# Patient Record
Sex: FEMALE | Race: BLACK OR AFRICAN AMERICAN | Employment: UNEMPLOYED | URBAN - METROPOLITAN AREA
[De-identification: names, ages, dates, MRNs, and addresses within clinical notes are randomized per-mention and may not be internally consistent; named-entity substitution may affect disease eponyms.]

---

## 2017-06-29 ENCOUNTER — HOSPITAL ENCOUNTER (OUTPATIENT)
Dept: PHYSICAL THERAPY | Age: 62
Discharge: HOME | End: 2017-06-30
Admitting: SOCIAL WORKER

## 2017-08-04 ENCOUNTER — HOSPITAL ENCOUNTER (OUTPATIENT)
Dept: PHYSICAL THERAPY | Age: 62
Discharge: HOME | End: 2017-08-04
Admitting: SOCIAL WORKER

## 2017-08-08 ENCOUNTER — HOSPITAL ENCOUNTER (OUTPATIENT)
Dept: PHYSICAL THERAPY | Age: 62
Discharge: HOME | End: 2017-08-08
Admitting: SOCIAL WORKER

## 2020-01-20 ENCOUNTER — APPOINTMENT (OUTPATIENT)
Dept: CT IMAGING | Age: 65
End: 2020-01-20
Payer: COMMERCIAL

## 2020-01-20 ENCOUNTER — HOSPITAL ENCOUNTER (EMERGENCY)
Age: 65
Discharge: HOME | End: 2020-01-20
Attending: EMERGENCY MEDICINE
Payer: COMMERCIAL

## 2020-01-20 VITALS
OXYGEN SATURATION: 90 % | SYSTOLIC BLOOD PRESSURE: 115 MMHG | HEART RATE: 87 BPM | TEMPERATURE: 98.3 F | RESPIRATION RATE: 14 BRPM | DIASTOLIC BLOOD PRESSURE: 86 MMHG

## 2020-01-20 LAB
A/G RATIO: 1.2 (ref 1.1–2.2)
ALBUMIN SERPL-MCNC: 4.2 G/DL (ref 3.4–5)
ALP BLD-CCNC: 103 U/L (ref 40–129)
ALT SERPL-CCNC: 10 U/L (ref 10–40)
ANION GAP SERPL CALCULATED.3IONS-SCNC: 11 MMOL/L (ref 3–16)
AST SERPL-CCNC: 13 U/L (ref 15–37)
BASOPHILS ABSOLUTE: 0.1 K/UL (ref 0–0.2)
BASOPHILS RELATIVE PERCENT: 0.6 %
BILIRUB SERPL-MCNC: 0.4 MG/DL (ref 0–1)
BILIRUBIN URINE: NEGATIVE
BLOOD, URINE: ABNORMAL
BUN BLDV-MCNC: 10 MG/DL (ref 7–20)
CALCIUM SERPL-MCNC: 9.5 MG/DL (ref 8.3–10.6)
CHLORIDE BLD-SCNC: 104 MMOL/L (ref 99–110)
CLARITY: ABNORMAL
CO2: 24 MMOL/L (ref 21–32)
COLOR: YELLOW
CREAT SERPL-MCNC: 1 MG/DL (ref 0.6–1.2)
EOSINOPHILS ABSOLUTE: 0.1 K/UL (ref 0–0.6)
EOSINOPHILS RELATIVE PERCENT: 1 %
EPITHELIAL CELLS, UA: 4 /HPF (ref 0–5)
GFR AFRICAN AMERICAN: >60
GFR NON-AFRICAN AMERICAN: 56
GLOBULIN: 3.4 G/DL
GLUCOSE BLD-MCNC: 114 MG/DL (ref 70–99)
GLUCOSE URINE: NEGATIVE MG/DL
HCT VFR BLD CALC: 39.2 % (ref 36–48)
HEMOGLOBIN: 13 G/DL (ref 12–16)
HYALINE CASTS: 8 /LPF (ref 0–8)
KETONES, URINE: NEGATIVE MG/DL
LEUKOCYTE ESTERASE, URINE: ABNORMAL
LIPASE: 22 U/L (ref 13–60)
LYMPHOCYTES ABSOLUTE: 3.5 K/UL (ref 1–5.1)
LYMPHOCYTES RELATIVE PERCENT: 38.2 %
MCH RBC QN AUTO: 30.9 PG (ref 26–34)
MCHC RBC AUTO-ENTMCNC: 33.2 G/DL (ref 31–36)
MCV RBC AUTO: 93.2 FL (ref 80–100)
MICROSCOPIC EXAMINATION: YES
MONOCYTES ABSOLUTE: 0.5 K/UL (ref 0–1.3)
MONOCYTES RELATIVE PERCENT: 5 %
NEUTROPHILS ABSOLUTE: 5.1 K/UL (ref 1.7–7.7)
NEUTROPHILS RELATIVE PERCENT: 55.2 %
NITRITE, URINE: NEGATIVE
PDW BLD-RTO: 13.5 % (ref 12.4–15.4)
PH UA: 6.5 (ref 5–8)
PLATELET # BLD: 366 K/UL (ref 135–450)
PMV BLD AUTO: 6.5 FL (ref 5–10.5)
POTASSIUM SERPL-SCNC: 3.9 MMOL/L (ref 3.5–5.1)
PROTEIN UA: NEGATIVE MG/DL
RBC # BLD: 4.21 M/UL (ref 4–5.2)
RBC UA: 33 /HPF (ref 0–4)
SODIUM BLD-SCNC: 139 MMOL/L (ref 136–145)
SPECIFIC GRAVITY UA: 1.02 (ref 1–1.03)
TOTAL PROTEIN: 7.6 G/DL (ref 6.4–8.2)
URINE REFLEX TO CULTURE: YES
URINE TYPE: ABNORMAL
UROBILINOGEN, URINE: 0.2 E.U./DL
WBC # BLD: 9.2 K/UL (ref 4–11)
WBC UA: 37 /HPF (ref 0–5)

## 2020-01-20 PROCEDURE — 99284 EMERGENCY DEPT VISIT MOD MDM: CPT

## 2020-01-20 PROCEDURE — 83690 ASSAY OF LIPASE: CPT

## 2020-01-20 PROCEDURE — 6370000000 HC RX 637 (ALT 250 FOR IP): Performed by: PHYSICIAN ASSISTANT

## 2020-01-20 PROCEDURE — 85025 COMPLETE CBC W/AUTO DIFF WBC: CPT

## 2020-01-20 PROCEDURE — 74176 CT ABD & PELVIS W/O CONTRAST: CPT

## 2020-01-20 PROCEDURE — 81001 URINALYSIS AUTO W/SCOPE: CPT

## 2020-01-20 PROCEDURE — 87086 URINE CULTURE/COLONY COUNT: CPT

## 2020-01-20 PROCEDURE — 80053 COMPREHEN METABOLIC PANEL: CPT

## 2020-01-20 PROCEDURE — 96374 THER/PROPH/DIAG INJ IV PUSH: CPT

## 2020-01-20 PROCEDURE — 6360000002 HC RX W HCPCS: Performed by: PHYSICIAN ASSISTANT

## 2020-01-20 PROCEDURE — 2580000003 HC RX 258: Performed by: PHYSICIAN ASSISTANT

## 2020-01-20 PROCEDURE — 96375 TX/PRO/DX INJ NEW DRUG ADDON: CPT

## 2020-01-20 RX ORDER — HYDROMORPHONE HYDROCHLORIDE 1 MG/ML
1 INJECTION, SOLUTION INTRAMUSCULAR; INTRAVENOUS; SUBCUTANEOUS ONCE
Status: COMPLETED | OUTPATIENT
Start: 2020-01-20 | End: 2020-01-20

## 2020-01-20 RX ORDER — OXYCODONE HYDROCHLORIDE AND ACETAMINOPHEN 5; 325 MG/1; MG/1
1 TABLET ORAL ONCE
Status: COMPLETED | OUTPATIENT
Start: 2020-01-20 | End: 2020-01-20

## 2020-01-20 RX ORDER — 0.9 % SODIUM CHLORIDE 0.9 %
500 INTRAVENOUS SOLUTION INTRAVENOUS ONCE
Status: COMPLETED | OUTPATIENT
Start: 2020-01-20 | End: 2020-01-20

## 2020-01-20 RX ORDER — HYDROCODONE BITARTRATE AND ACETAMINOPHEN 5; 325 MG/1; MG/1
1 TABLET ORAL EVERY 6 HOURS PRN
Qty: 10 TABLET | Refills: 0 | Status: SHIPPED | OUTPATIENT
Start: 2020-01-20 | End: 2020-01-23

## 2020-01-20 RX ORDER — TAMSULOSIN HYDROCHLORIDE 0.4 MG/1
0.4 CAPSULE ORAL ONCE
Status: COMPLETED | OUTPATIENT
Start: 2020-01-20 | End: 2020-01-20

## 2020-01-20 RX ORDER — CIPROFLOXACIN 500 MG/1
500 TABLET, FILM COATED ORAL 2 TIMES DAILY
Qty: 20 TABLET | Refills: 0 | Status: SHIPPED | OUTPATIENT
Start: 2020-01-20 | End: 2020-01-30

## 2020-01-20 RX ORDER — ONDANSETRON 4 MG/1
4 TABLET, FILM COATED ORAL EVERY 8 HOURS PRN
Qty: 20 TABLET | Refills: 0 | Status: SHIPPED | OUTPATIENT
Start: 2020-01-20

## 2020-01-20 RX ORDER — TAMSULOSIN HYDROCHLORIDE 0.4 MG/1
0.4 CAPSULE ORAL DAILY
Qty: 5 CAPSULE | Refills: 0 | Status: SHIPPED | OUTPATIENT
Start: 2020-01-20 | End: 2020-01-25

## 2020-01-20 RX ORDER — ONDANSETRON 2 MG/ML
4 INJECTION INTRAMUSCULAR; INTRAVENOUS ONCE
Status: COMPLETED | OUTPATIENT
Start: 2020-01-20 | End: 2020-01-20

## 2020-01-20 RX ADMIN — ONDANSETRON 4 MG: 2 INJECTION INTRAMUSCULAR; INTRAVENOUS at 16:11

## 2020-01-20 RX ADMIN — OXYCODONE HYDROCHLORIDE AND ACETAMINOPHEN 1 TABLET: 5; 325 TABLET ORAL at 17:53

## 2020-01-20 RX ADMIN — HYDROMORPHONE HYDROCHLORIDE 1 MG: 1 INJECTION, SOLUTION INTRAMUSCULAR; INTRAVENOUS; SUBCUTANEOUS at 16:12

## 2020-01-20 RX ADMIN — SODIUM CHLORIDE 500 ML: 9 INJECTION, SOLUTION INTRAVENOUS at 16:10

## 2020-01-20 RX ADMIN — TAMSULOSIN HYDROCHLORIDE 0.4 MG: 0.4 CAPSULE ORAL at 17:52

## 2020-01-20 ASSESSMENT — PAIN DESCRIPTION - PAIN TYPE: TYPE: ACUTE PAIN

## 2020-01-20 ASSESSMENT — ENCOUNTER SYMPTOMS
STRIDOR: 0
CONSTIPATION: 0
BLOOD IN STOOL: 0
WHEEZING: 0
ABDOMINAL PAIN: 1
EYE PAIN: 0
BACK PAIN: 0
RECTAL PAIN: 0
COLOR CHANGE: 0
EYE REDNESS: 0
ANAL BLEEDING: 0
COUGH: 0
NAUSEA: 1
ABDOMINAL DISTENTION: 0
SHORTNESS OF BREATH: 0
VOMITING: 0
DIARRHEA: 0

## 2020-01-20 ASSESSMENT — PAIN SCALES - GENERAL
PAINLEVEL_OUTOF10: 10
PAINLEVEL_OUTOF10: 8
PAINLEVEL_OUTOF10: 7
PAINLEVEL_OUTOF10: 10
PAINLEVEL_OUTOF10: 9

## 2020-01-20 NOTE — ED NOTES
Dr Linda Paige at bedside. Patient resting comfortably with no signs of distress. Denies any needs at this time. Bed locked and in lowest position with both side rails raised. Call light within reach.      Lopez Kwong RN  01/20/20 4653

## 2020-01-20 NOTE — ED PROVIDER NOTES
[facility]        Pt Name: Maryetta Fabry  MRN: [id number]  [place] 1955  Date of evaluation: 1/20/2020  Provider: Aba Pollard PA-C  PCP: Wilda Mei    This patient was seen and evaluated by the attending physician Dr Ezequiel Hernandez       Chief Complaint   Patient presents with    Flank Pain     pt with extreme pain in right side of groin and into her back. HISTORY OF PRESENT ILLNESS   (Location/Symptom, Timing/Onset, Context/Setting, Quality, Duration, Modifying Factors, Severity)  Note limiting factors. Maryetta Fabry is a 71 y.o. female with history of hypertension, bipolar disorder, interstitial cystitis who presents to the emergency department writhing in pain in the right flank with radiation to the right low abdomen/groin starting yesterday. She denies any changes in urination or bowels. Reports some mild nausea. She rates her pain to be a 10 out of 10 on pain scale. She has no history of kidney stone. She states this does not feel like interstitial cystitis type of pain. She sees a nurse practitioner with the urology group. She is on multiple medications for interstitial cystitis. Nursing Notes were all reviewed and agreed with or any disagreements were addressed in the HPI. REVIEW OF SYSTEMS    (2-9 systems for level 4, 10 or more for level 5)     Review of Systems   Constitutional: Negative for chills and fever. HENT: Negative. Eyes: Negative for pain, redness and visual disturbance. Respiratory: Negative for cough, shortness of breath, wheezing and stridor. Cardiovascular: Negative for chest pain, palpitations and leg swelling. Gastrointestinal: Positive for abdominal pain and nausea. Negative for abdominal distention, anal bleeding, blood in stool, constipation, diarrhea, rectal pain and vomiting. Genitourinary: Positive for flank pain.  Negative for mg under the tongue every 6 hours as needed for Cramping    LAMOTRIGINE (LAMICTAL) 25 MG TABLET    Take 150 mg by mouth daily     MAGNESIUM 30 MG TABLET    Take 30 mg by mouth daily 250    MONTELUKAST (SINGULAIR) 10 MG TABLET    Take 10 mg by mouth nightly    MULTIPLE VITAMINS-MINERALS (THERAPEUTIC MULTIVITAMIN-MINERALS) TABLET    Take 1 tablet by mouth daily    OMEPRAZOLE (PRILOSEC) 40 MG CAPSULE    Take 40 mg by mouth daily    PENTOSAN POLYSULFATE (ELMIRON) 100 MG CAPSULE    Take 100 mg by mouth 3 times daily (before meals)    PROBIOTIC PRODUCT (PROBIOTIC DAILY PO)    Take by mouth    PROMETHAZINE (PHENERGAN) 25 MG TABLET    Take 25 mg by mouth every 6 hours as needed for Nausea    TRIAZOLAM (HALCION) 0.25 MG TABLET    Take 0.25 mg by mouth nightly    UNABLE TO FIND    IODINE ONE DROP DAILY AND KELP ONE DROP DAILY         ALLERGIES     Ibuprofen; Iodides; Naproxen; and Codeine    FAMILYHISTORY       Family History   Problem Relation Age of Onset    Heart Disease Mother     Heart Disease Father     Heart Disease Brother           SOCIAL HISTORY       Social History     Tobacco Use    Smoking status: Former Smoker     Packs/day: 0.50     Years: 20.00     Pack years: 10.00     Types: Cigarettes     Last attempt to quit: 2012     Years since quittin.0    Smokeless tobacco: Never Used   Substance Use Topics    Alcohol use: Yes     Comment: socially    Drug use: No       SCREENINGS             PHYSICAL EXAM    (up to 7 for level 4, 8 or more for level 5)     ED Triage Vitals   BP Temp Temp Source Pulse Resp SpO2 Height Weight   -- 20 1544 20 1544 20 1547 20 1547 20 1547 -- --    98.3 °F (36.8 °C) Infrared 80 17 95 %         Physical Exam  Vitals signs and nursing note reviewed. Constitutional:       Appearance: Normal appearance. She is well-developed. She is not toxic-appearing or diaphoretic. HENT:      Head: Normocephalic and atraumatic.       Right Ear: External ear abscess,  urosepsis, fistula or other concerning pathology. FINAL IMPRESSION      1. Ureteric stone    2. Renal colic          DISPOSITION/PLAN   DISPOSITION Decision To Discharge 01/20/2020 05:46:24 PM      PATIENT REFERREDTO:  Jose Jaque[address]  [phone]      for follow up in 1-3 days    [facility] Emergency Department  [address] [facility]  [phone]    If symptoms worsen    Argentina Mcbride MD  [address] [place] [address] [phone]      or see your urologist in 1-2 days      DISCHARGE MEDICATIONS:  New Prescriptions    CIPROFLOXACIN (CIPRO) 500 MG TABLET    Take 1 tablet by mouth 2 times daily for 10 days    HYDROCODONE-ACETAMINOPHEN (NORCO) 5-325 MG PER TABLET    Take 1 tablet by mouth every 6 hours as needed for Pain for up to 3 days.     ONDANSETRON (ZOFRAN) 4 MG TABLET    Take 1 tablet by mouth every 8 hours as needed for Nausea    TAMSULOSIN (FLOMAX) 0.4 MG CAPSULE    Take 1 capsule by mouth daily for 5 doses       DISCONTINUED MEDICATIONS:  Discontinued Medications    No medications on file              (Please note that portions of this note were completed with a voice recognition program.  Efforts were made to edit the dictations but occasionally words are mis-transcribed.)    Becka Hand PA-C (electronically signed)           Becka Hand PA-C  01/20/20 1953

## 2020-01-20 NOTE — ED NOTES
Pt states pain feeling better, states unable to urinate at this time.      Mitzi Rubinstein, RN  01/20/20 4836

## 2020-01-20 NOTE — ED NOTES
Patient resting comfortably with signs of distress with pain 8/10. Po pain pill given per mar. Denies any needs at this time. Bed locked and in lowest position with both side rails raised. Call light within reach. Pt updated on plan of care.       Brendon Ceballos RN  01/20/20 0249

## 2020-01-20 NOTE — ED PROVIDER NOTES
I independently performed a history and physical on [address] [place] [address]. All diagnostic, treatment, and disposition decisions were made by myself in conjunction with the advanced practice provider. Briefly, this is a 71 y.o. female here for flank pain for the past day patient with bladder stenosis hematuria dysuria. No fevers no chills no sweats nausea no diarrhea. Jacinto Car Hx of interstitial cystitis, IBS, GERD, DM, Bipolar. On exam, WDWN F, NAD, Heart RRR, Lungs CTAB, no r/r/w. Abdomen Soft, NT, ND. Screenings            MDM  Work-up has a small 3 mm stone left side with minimal hydro. Will control pain with analgesia have follow-up with urology as an outpatient. Patient Referrals:  June Kaminski  [address]  [phone]      for follow up in 1-3 days    [facility] Emergency Department  [address] [facility]  [phone]    If symptoms worsen    Tobias Stevenson MD  [address] [place] [address] [phone]      or see your urologist in 1-2 days      Discharge Medications:  New Prescriptions    CIPROFLOXACIN (CIPRO) 500 MG TABLET    Take 1 tablet by mouth 2 times daily for 10 days    HYDROCODONE-ACETAMINOPHEN (NORCO) 5-325 MG PER TABLET    Take 1 tablet by mouth every 6 hours as needed for Pain for up to 3 days. ONDANSETRON (ZOFRAN) 4 MG TABLET    Take 1 tablet by mouth every 8 hours as needed for Nausea    TAMSULOSIN (FLOMAX) 0.4 MG CAPSULE    Take 1 capsule by mouth daily for 5 doses       FINAL IMPRESSION  1. Ureteric stone    2. Renal colic        Blood pressure 129/80, pulse 85, temperature 98.3 °F (36.8 °C), temperature source Infrared, resp. rate 14, SpO2 97 %. For further details of [facility] emergency department encounter, please see documentation by advanced practice provider, Luis A Javier MD  01/20/20 4342

## 2020-01-20 NOTE — ED NOTES
Bed: 22  Expected date:   Expected time:   Means of arrival:   Comments:  [address] GENOVEVA Mancini RN  01/20/20 7799

## 2020-01-20 NOTE — ED NOTES
Discharge instructions and prescriptions reviewed with pt. Pt allowed opportunity to ask questions and verbalized understanding. Pt provided one copy of discharge instructions, pt provided urine strainer to take home, instructions given, if stone pass to save and take to urologist appointment to be assessed. Pt walking w no c/o pain. Pt walked out to car with .       Dusty Ventura RN  01/20/20 4728

## 2020-01-20 NOTE — ED NOTES
Pt back from Ct. Back in bed and back on monitor. Patient resting comfortably with no signs of distress. Denies any needs at this time. Bed locked and in lowest position with both side rails raised. Call light within reach. Patient brought warm blanket at patient's request.  at bedside.  Light off at patient request.     Edmon Bernheim, RN  01/20/20 2812

## 2020-01-21 LAB — URINE CULTURE, ROUTINE: NORMAL

## 2020-02-27 ENCOUNTER — HOSPITAL ENCOUNTER (OUTPATIENT)
Dept: CT IMAGING | Age: 65
Discharge: HOME | End: 2020-02-27
Payer: COMMERCIAL

## 2020-02-27 PROCEDURE — 74176 CT ABD & PELVIS W/O CONTRAST: CPT

## 2021-12-28 ENCOUNTER — APPOINTMENT (OUTPATIENT)
Dept: CT IMAGING | Age: 66
End: 2021-12-28
Payer: COMMERCIAL

## 2021-12-28 ENCOUNTER — HOSPITAL ENCOUNTER (EMERGENCY)
Age: 66
Discharge: HOME | End: 2021-12-28
Attending: EMERGENCY MEDICINE
Payer: COMMERCIAL

## 2021-12-28 VITALS
OXYGEN SATURATION: 96 % | RESPIRATION RATE: 14 BRPM | SYSTOLIC BLOOD PRESSURE: 141 MMHG | HEART RATE: 76 BPM | DIASTOLIC BLOOD PRESSURE: 98 MMHG | BODY MASS INDEX: 37.75 KG/M2 | WEIGHT: 241 LBS | TEMPERATURE: 98.1 F

## 2021-12-28 DIAGNOSIS — M79.604 RIGHT LEG PAIN: Primary | ICD-10-CM

## 2021-12-28 DIAGNOSIS — R06.00 DYSPNEA, UNSPECIFIED TYPE: ICD-10-CM

## 2021-12-28 DIAGNOSIS — R03.0 ELEVATED BLOOD PRESSURE READING: ICD-10-CM

## 2021-12-28 LAB
A/G RATIO: 1.2 (ref 1.1–2.2)
ALBUMIN SERPL-MCNC: 4.2 G/DL (ref 3.4–5)
ALP BLD-CCNC: 142 U/L (ref 40–129)
ALT SERPL-CCNC: 13 U/L (ref 10–40)
ANION GAP SERPL CALCULATED.3IONS-SCNC: 10 MMOL/L (ref 3–16)
APTT: 35.2 SEC (ref 26.2–38.6)
AST SERPL-CCNC: 15 U/L (ref 15–37)
BASOPHILS ABSOLUTE: 0.1 K/UL (ref 0–0.2)
BASOPHILS RELATIVE PERCENT: 0.9 %
BILIRUB SERPL-MCNC: 0.4 MG/DL (ref 0–1)
BUN BLDV-MCNC: 10 MG/DL (ref 7–20)
CALCIUM SERPL-MCNC: 9.7 MG/DL (ref 8.3–10.6)
CHLORIDE BLD-SCNC: 100 MMOL/L (ref 99–110)
CO2: 27 MMOL/L (ref 21–32)
CREAT SERPL-MCNC: 0.9 MG/DL (ref 0.6–1.2)
EKG ATRIAL RATE: 81 BPM
EKG DIAGNOSIS: NORMAL
EKG P AXIS: 49 DEGREES
EKG P-R INTERVAL: 170 MS
EKG Q-T INTERVAL: 346 MS
EKG QRS DURATION: 70 MS
EKG QTC CALCULATION (BAZETT): 401 MS
EKG R AXIS: -12 DEGREES
EKG T AXIS: 0 DEGREES
EKG VENTRICULAR RATE: 81 BPM
EOSINOPHILS ABSOLUTE: 0.1 K/UL (ref 0–0.6)
EOSINOPHILS RELATIVE PERCENT: 1.3 %
GFR AFRICAN AMERICAN: >60
GFR NON-AFRICAN AMERICAN: >60
GLUCOSE BLD-MCNC: 112 MG/DL (ref 70–99)
HCG QUALITATIVE: NEGATIVE
HCT VFR BLD CALC: 39.8 % (ref 36–48)
HEMOGLOBIN: 13.3 G/DL (ref 12–16)
INR BLD: 0.92 (ref 0.88–1.12)
LYMPHOCYTES ABSOLUTE: 2.4 K/UL (ref 1–5.1)
LYMPHOCYTES RELATIVE PERCENT: 29.8 %
MCH RBC QN AUTO: 30.3 PG (ref 26–34)
MCHC RBC AUTO-ENTMCNC: 33.4 G/DL (ref 31–36)
MCV RBC AUTO: 90.6 FL (ref 80–100)
MONOCYTES ABSOLUTE: 0.4 K/UL (ref 0–1.3)
MONOCYTES RELATIVE PERCENT: 5.5 %
NEUTROPHILS ABSOLUTE: 5 K/UL (ref 1.7–7.7)
NEUTROPHILS RELATIVE PERCENT: 62.5 %
PDW BLD-RTO: 13.6 % (ref 12.4–15.4)
PLATELET # BLD: 402 K/UL (ref 135–450)
PMV BLD AUTO: 6.1 FL (ref 5–10.5)
POTASSIUM REFLEX MAGNESIUM: 3.7 MMOL/L (ref 3.5–5.1)
PRO-BNP: 52 PG/ML (ref 0–124)
PROTHROMBIN TIME: 10.4 SEC (ref 9.9–12.7)
RBC # BLD: 4.39 M/UL (ref 4–5.2)
SODIUM BLD-SCNC: 137 MMOL/L (ref 136–145)
TOTAL PROTEIN: 7.6 G/DL (ref 6.4–8.2)
TROPONIN: <0.01 NG/ML
WBC # BLD: 8 K/UL (ref 4–11)

## 2021-12-28 PROCEDURE — 93005 ELECTROCARDIOGRAM TRACING: CPT | Performed by: PHYSICIAN ASSISTANT

## 2021-12-28 PROCEDURE — 93010 ELECTROCARDIOGRAM REPORT: CPT | Performed by: INTERNAL MEDICINE

## 2021-12-28 PROCEDURE — 96374 THER/PROPH/DIAG INJ IV PUSH: CPT

## 2021-12-28 PROCEDURE — 80053 COMPREHEN METABOLIC PANEL: CPT

## 2021-12-28 PROCEDURE — 6370000000 HC RX 637 (ALT 250 FOR IP): Performed by: PHYSICIAN ASSISTANT

## 2021-12-28 PROCEDURE — 6360000004 HC RX CONTRAST MEDICATION: Performed by: EMERGENCY MEDICINE

## 2021-12-28 PROCEDURE — 93971 EXTREMITY STUDY: CPT

## 2021-12-28 PROCEDURE — 84703 CHORIONIC GONADOTROPIN ASSAY: CPT

## 2021-12-28 PROCEDURE — 6360000002 HC RX W HCPCS: Performed by: PHYSICIAN ASSISTANT

## 2021-12-28 PROCEDURE — 85025 COMPLETE CBC W/AUTO DIFF WBC: CPT

## 2021-12-28 PROCEDURE — 85730 THROMBOPLASTIN TIME PARTIAL: CPT

## 2021-12-28 PROCEDURE — 85610 PROTHROMBIN TIME: CPT

## 2021-12-28 PROCEDURE — 84484 ASSAY OF TROPONIN QUANT: CPT

## 2021-12-28 PROCEDURE — 71260 CT THORAX DX C+: CPT

## 2021-12-28 PROCEDURE — 83880 ASSAY OF NATRIURETIC PEPTIDE: CPT

## 2021-12-28 PROCEDURE — 99285 EMERGENCY DEPT VISIT HI MDM: CPT

## 2021-12-28 RX ORDER — DIPHENHYDRAMINE HYDROCHLORIDE 50 MG/ML
25 INJECTION INTRAMUSCULAR; INTRAVENOUS ONCE
Status: COMPLETED | OUTPATIENT
Start: 2021-12-28 | End: 2021-12-28

## 2021-12-28 RX ORDER — HYDROCODONE BITARTRATE AND ACETAMINOPHEN 5; 325 MG/1; MG/1
1 TABLET ORAL ONCE
Status: COMPLETED | OUTPATIENT
Start: 2021-12-28 | End: 2021-12-28

## 2021-12-28 RX ORDER — METHYLPREDNISOLONE SODIUM SUCCINATE 125 MG/2ML
125 INJECTION, POWDER, LYOPHILIZED, FOR SOLUTION INTRAMUSCULAR; INTRAVENOUS ONCE
Status: COMPLETED | OUTPATIENT
Start: 2021-12-28 | End: 2021-12-28

## 2021-12-28 RX ORDER — ONDANSETRON 4 MG/1
4 TABLET, ORALLY DISINTEGRATING ORAL ONCE
Status: COMPLETED | OUTPATIENT
Start: 2021-12-28 | End: 2021-12-28

## 2021-12-28 RX ADMIN — METHYLPREDNISOLONE SODIUM SUCCINATE 125 MG: 125 INJECTION, POWDER, FOR SOLUTION INTRAMUSCULAR; INTRAVENOUS at 20:14

## 2021-12-28 RX ADMIN — DIPHENHYDRAMINE HYDROCHLORIDE 25 MG: 50 INJECTION, SOLUTION INTRAMUSCULAR; INTRAVENOUS at 20:14

## 2021-12-28 RX ADMIN — ONDANSETRON 4 MG: 4 TABLET, ORALLY DISINTEGRATING ORAL at 12:36

## 2021-12-28 RX ADMIN — IOPAMIDOL 75 ML: 755 INJECTION, SOLUTION INTRAVENOUS at 21:00

## 2021-12-28 RX ADMIN — HYDROCODONE BITARTRATE AND ACETAMINOPHEN 1 TABLET: 5; 325 TABLET ORAL at 12:36

## 2021-12-28 ASSESSMENT — ENCOUNTER SYMPTOMS
COLOR CHANGE: 0
CONSTIPATION: 0
BACK PAIN: 0
VOMITING: 0
DIARRHEA: 0
RESPIRATORY NEGATIVE: 1
NAUSEA: 0
SHORTNESS OF BREATH: 0
COUGH: 0
CHEST TIGHTNESS: 0
ABDOMINAL PAIN: 0

## 2021-12-28 ASSESSMENT — PAIN SCALES - GENERAL
PAINLEVEL_OUTOF10: 5
PAINLEVEL_OUTOF10: 5
PAINLEVEL_OUTOF10: 2
PAINLEVEL_OUTOF10: 4

## 2021-12-28 ASSESSMENT — PAIN DESCRIPTION - PAIN TYPE
TYPE: ACUTE PAIN
TYPE: ACUTE PAIN

## 2021-12-28 NOTE — ED PROVIDER NOTES
[facility]        Pt Name: Meño Lebron  MRN: [id number]  [place] 1955  Date of evaluation: 12/28/2021  Provider: PEACE Franklin  PCP: Mike Hutchins  Note Started: 12:28 PM EST        I have seen and evaluated this patient with my supervising physician Yonny Alejandre       Chief Complaint   Patient presents with    Other     PT presents with concern for blood clot in right leg       HISTORY OF PRESENT ILLNESS   (Location, Timing/Onset, Context/Setting, Quality, Duration, Modifying Factors, Severity, Associated Signs and Symptoms)  Note limiting factors. Chief Complaint: Right Leg Pain     Meño Lebron is a 78 y.o. female with past medical history of chronic back pain, diabetes, GERD, hypertension, IBS and bipolar affective disorder who presents to the ED with complaint of right leg pain. Patient is for the past several days she has had pain to her right leg mostly over the fine but does radiate into the calf. Patient states pain seemed to worsen with palpation. Patient that she does have a history of nerve damage in the right leg and states she initially thought this was just the case but states this feels different. Patient that she has had blood clots in the past and became concerned that potentially she had another blood clot so came to the ED for further evaluation and treatment. She states she is not currently on any blood thinners. Denies recent travel, trips, surgery or immobilization. She denies any hormone or birth control medication usage. Denies any injury or trauma. Denies significant edema, ecchymosis, erythema or warmth. Denies rashes or lesions. Denies skin changes. Denies fever chills. Denies any numbness or tingling. She denies any decreased range of motion or strength.   Denies any back pain, abdominal pain, nausea/vomiting, chest pain, shortness of breath, cough, hemoptysis, lightheadedness/dizziness, syncope, near syncope or diaphoresis. Nursing Notes were all reviewed and agreed with or any disagreements were addressed in the HPI. REVIEW OF SYSTEMS    (2-9 systems for level 4, 10 or more for level 5)     Review of Systems   Constitutional: Negative for activity change, appetite change, chills, diaphoresis, fatigue and fever. Respiratory: Negative. Negative for cough, chest tightness and shortness of breath. Cardiovascular: Negative. Negative for chest pain, palpitations and leg swelling. Gastrointestinal: Negative for abdominal pain, constipation, diarrhea, nausea and vomiting. Genitourinary: Negative for decreased urine volume, difficulty urinating, dysuria, flank pain, frequency, hematuria and urgency. Musculoskeletal: Positive for myalgias. Negative for arthralgias, back pain, gait problem, joint swelling, neck pain and neck stiffness. Skin: Negative for color change, pallor, rash and wound. Neurological: Negative for dizziness, light-headedness and headaches. Positives and Pertinent negatives as per HPI. Except as noted above in the ROS, all other systems were reviewed and negative.        PAST MEDICAL HISTORY     Past Medical History:   Diagnosis Date    Bipolar affective disorder ([facility]     Bladder disorder     Chronic back pain     Diabetes mellitus ([facility]     Fatty liver 4/29/2013    GERD (gastroesophageal reflux disease)     Hypertension     IBS (irritable bowel syndrome)          SURGICAL HISTORY     Past Surgical History:   Procedure Laterality Date    BLADDER SURGERY      BREAST SURGERY      two cysts removed Right breast    BUNIONECTOMY      x2    CARPAL TUNNEL RELEASE  1989    CHOLECYSTECTOMY, LAPAROSCOPIC  4/12/13    with liver bx    COLONOSCOPY      DILATION AND CURETTAGE OF UTERUS      x2    HYSTERECTOMY           [address]       Discharge Medication List as of 12/28/2021 11:09 PM      CONTINUE these medications which have NOT CHANGED    Details   ondansetron (ZOFRAN) 4 MG tablet Take 1 tablet by mouth every 8 hours as needed for Nausea, Disp-20 tablet, R-0Print      tamsulosin (FLOMAX) 0.4 MG capsule Take 1 capsule by mouth daily for 5 doses, Disp-5 capsule, R-0Print      pentosan polysulfate (ELMIRON) 100 MG capsule Take 100 mg by mouth 3 times daily (before meals)      omeprazole (PRILOSEC) 40 MG capsule Take 40 mg by mouth daily      hyoscyamine (LEVSIN/SL) 0.125 MG SL tablet Place 0.125 mg under the tongue every 6 hours as needed for Cramping      montelukast (SINGULAIR) 10 MG tablet Take 10 mg by mouth nightly      triazolam (HALCION) 0.25 MG tablet Take 0.25 mg by mouth nightly      promethazine (PHENERGAN) 25 MG tablet Take 25 mg by mouth every 6 hours as needed for Nausea      Multiple Vitamins-Minerals (THERAPEUTIC MULTIVITAMIN-MINERALS) tablet Take 1 tablet by mouth daily      calcium carbonate (OSCAL) 500 MG TABS tablet Take 500 mg by mouth daily      Probiotic Product (PROBIOTIC DAILY PO) Take by mouth      magnesium 30 MG tablet Take 30 mg by mouth daily 250      UNABLE TO FIND IODINE ONE DROP DAILY AND KELP ONE DROP DAILY      fluticasone-salmeterol (ADVAIR) 500-50 MCG/DOSE diskus inhaler Inhale 1 puff into the lungs every 12 hours      albuterol (PROVENTIL HFA;VENTOLIN HFA) 108 (90 BASE) MCG/ACT inhaler Inhale 2 puffs into the lungs every 6 hours as needed for Wheezing      Biotin 5000 MCG CAPS Take 3,000 mcg by mouth daily       cyclobenzaprine (FLEXERIL) 10 MG tablet Take 10 mg by mouth 3 times daily as needed.       Cholecalciferol (VITAMIN D-3) 5000 UNITS TABS Take  by mouth.      lamotrigine (LAMICTAL) 25 MG tablet Take 150 mg by mouth daily                ALLERGIES     Ibuprofen, Iodides, Naproxen, and Codeine    FAMILYHISTORY       Family History   Problem Relation Age of Onset    Heart Disease Mother     Heart Disease Father     Heart Disease Brother           SOCIAL HISTORY Social History     Tobacco Use    Smoking status: Former Smoker     Packs/day: 0.50     Years: 20.00     Pack years: 10.00     Types: Cigarettes     Quit date: 2012     Years since quittin.9    Smokeless tobacco: Never Used   Substance Use Topics    Alcohol use: Yes     Comment: socially    Drug use: No       SCREENINGS    Trevin Coma Scale  Eye Opening: Spontaneous  Best Verbal Response: Oriented  Best Motor Response: Obeys commands  Trevin Coma Scale Score: 15        PHYSICAL EXAM    (up to 7 for level 4, 8 or more for level 5)     ED Triage Vitals [21 1216]   BP Temp Temp Source Pulse Resp SpO2 Height Weight   (!) 163/124 98.1 °F (36.7 °C) Temporal 111 14 96 % -- 241 lb (109.3 kg)       Physical Exam  Constitutional:       General: She is not in acute distress. Appearance: Normal appearance. She is well-developed. She is not ill-appearing, toxic-appearing or diaphoretic. HENT:      Head: Normocephalic and atraumatic. Right Ear: External ear normal.      Left Ear: External ear normal.   Eyes:      General:         Right eye: No discharge. Left eye: No discharge. Cardiovascular:      Rate and Rhythm: Regular rhythm. Tachycardia present. Pulses: Normal pulses. Heart sounds: Normal heart sounds. No murmur heard. No friction rub. No gallop. Comments: 2+ radial pulses bilaterally. No pedal edema. No calf tenderness. No JVP  Pulmonary:      Effort: Pulmonary effort is normal. No respiratory distress. Breath sounds: Normal breath sounds. No stridor. No wheezing, rhonchi or rales. Chest:      Chest wall: No tenderness. Abdominal:      General: Abdomen is flat. There is no distension. Palpations: Abdomen is soft. There is no mass. Tenderness: There is no abdominal tenderness. There is no right CVA tenderness, left CVA tenderness, guarding or rebound. Hernia: No hernia is present.    Musculoskeletal:         General: Normal range of motion. Cervical back: Normal range of motion and neck supple. Comments: Upon examination of the right lower extremity patient is tender to palpate over the right thigh diffusely. There is no significant edema, ecchymoses, erythema or warmth. No asymmetric swelling with comparison to the left. There is no significant calf pain. Negative Homans' sign bilaterally. There is no palpable cords. full range of motion strength of the hip, knee and ankle. Full plantar flexion/dorsiflexion. Compartments are soft. No abrasion or laceration. No rashes or lesions. Capillary refill and dorsalis pedis pulse brisk. Sensation intact to the medial lateral aspects of the distal toes. Gait is normal.     Skin:     General: Skin is warm and dry. Coloration: Skin is not pale. Findings: No erythema. Neurological:      Mental Status: She is alert and oriented to person, place, and time.    Psychiatric:         Behavior: Behavior normal.         DIAGNOSTIC RESULTS   LABS:    Labs Reviewed   COMPREHENSIVE METABOLIC PANEL W/ REFLEX TO MG FOR LOW K - Abnormal; Notable for the following components:       Result Value    Glucose 112 (*)     Alkaline Phosphatase 142 (*)     All other components within normal limits    Narrative:     Performed at:  [facility] [address], [place], [address] [email or web address]   Phone [phone]   CBC WITH AUTO DIFFERENTIAL    Narrative:     Performed at:  [facility] [address], [place], [facility] [email or web address]   Phone [phone]   TROPONIN    Narrative:     Performed at:  [facility] [address], [place], [address] [email or web address]   Phone [phone]   BRAIN NATRIURETIC PEPTIDE    Narrative:     Performed at:  [facility] [address], [place], [facility] [email or web address]   Phone [phone]   PROTIME-INR    Narrative:     Performed at:  [facility]  [address] [facility], [address] [facility]   Phone [phone]   APTT    Narrative:     Performed at:  [facility]  [address] [facility], [address] [facility]   Phone [phone]   HCG, SERUM, QUALITATIVE    Narrative:     Performed at:  [facility]  [address] [facility], [address]   Phone [phone]       When ordered only abnormal lab results are displayed. All other labs were within normal range or not returned as of this dictation. EKG: When ordered, EKG's are interpreted by the Emergency Department Physician in the absence of a cardiologist.  Please see their note for interpretation of EKG. RADIOLOGY:   Non-plain film images such as CT, Ultrasound and MRI are read by the radiologist. Plain radiographic images are visualized and preliminarily interpreted by the ED Provider with the below findings:        Interpretation per the Radiologist below, if available at the time of this note:    CT CHEST PULMONARY EMBOLISM W CONTRAST   Final Result   1. No evidence of acute pulmonary embolus. 2. No acute process in the chest.   3. Mild peripheral fibrotic change. VL Extremity Venous Right   Final Result        No results found.         PROCEDURES   Unless otherwise noted below, none     Procedures    CRITICAL CARE TIME   N/A    CONSULTS:  None      EMERGENCY DEPARTMENT COURSE and DIFFERENTIAL DIAGNOSIS/MDM:   Vitals:    Vitals:    12/28/21 2004 12/28/21 2014 12/28/21 2034 12/28/21 2308   BP: (!) 159/104 (!) 151/96 (!) 154/106 (!) 141/98   Pulse: 76 80 74 76   Resp: 18 14 16 14   Temp:       TempSrc:       SpO2: 92% 95% 95% 96%   Weight:           Patient was given the following medications:  Medications   methylPREDNISolone sodium (SOLU-MEDROL) injection 125 mg (125 mg IntraVENous Given 12/28/21 2014)   diphenhydrAMINE (BENADRYL) injection 25 mg (25 mg IntraVENous Given 12/28/21 2014)   HYDROcodone-acetaminophen ([address]) 5-325 MG per tablet 1 tablet (1 tablet Oral Given 12/28/21 1236)   ondansetron (ZOFRAN-ODT) disintegrating tablet 4 mg (4 mg Oral Given 12/28/21 1236)   iopamidol (ISOVUE-370) 76 % injection 75 mL (75 mLs IntraVENous Given 12/28/21 2100)           Patient is a 78-year-old female who presents to the ED with complaint of right leg pain. She is concerned she has DVT. She is not on anticoagulation. Has pain to the right thigh radiating to the right leg. Doppler ultrasound obtained and showed no evidence of deep vein or superficial vein thrombosis involving the right lower extremity or left common femoral vein. Patient is tachycardic and states she has some slight shortness of breath. Therefore CT of the chest ordered given patient's history and physical exam.  Patient has history of document allergy to iodine. Has had CTs in the past upon outpatient review of records premedication requested by CT tech. Premedication ordered with Solu-Medrol and Benadryl. Patient evaluated initial in the rapid assessment zone and could not receive premedication until back into a room. Unfortunately limited bed availability given Covid pandemic. Patient had extended stay in the waiting room waiting for bed availability so she can receive premedication for CT scan. Blood work obtained and showed CBC with normal white count, hemoglobin and platelets. CMP relatively unremarkable. Troponin and BNP unremarkable. Coags obtained. Pregnancy was negative. CT of the chest is currently pending given end of shift. Will sign out to attending provider. Please see attending provider note for further disposition and treatment of patient. FINAL IMPRESSION      1. Right leg pain    2. Dyspnea, unspecified type    3.  Elevated blood pressure reading          DISPOSITION/PLAN   DISPOSITION Decision To Discharge 12/28/2021 10:23:05 PM      PATIENT REFERRED TO:  Harish Gaona  [address] [facility] [address] [facility] [address]  [phone]    Schedule an appointment as soon as possible for a visit       [facility] Emergency Department  [address]  Go to   If symptoms worsen      DISCHARGE MEDICATIONS:  Discharge Medication List as of 12/28/2021 11:09 PM          DISCONTINUED MEDICATIONS:  Discharge Medication List as of 12/28/2021 11:09 PM                 (Please note that portions of this note were completed with a voice recognition program.  Efforts were made to edit the dictations but occasionally words are mis-transcribed.)    PEACE Menchaca (electronically signed)          PEACE Simons  12/29/21 141

## 2021-12-29 NOTE — ED NOTES
Returned from CT, no reaction noted.  Breathing even, non-labored      Cliff Jacobs RN  12/28/21 2707

## 2021-12-29 NOTE — ED NOTES
Pt received in bed resting, breathing easy, non-labored. No distress noted. Pt reports RLE pain x several days. Swelling noted to bilateral lower extremities. Placed on bedside cardiac monitor.      Fran Henderson RN  12/28/21 6521

## 2023-07-09 LAB — IMMUNOGLOBULIN E: 192 KU/L

## 2024-03-17 ENCOUNTER — APPOINTMENT (OUTPATIENT)
Dept: GENERAL RADIOLOGY | Age: 69
End: 2024-03-17
Payer: COMMERCIAL

## 2024-03-17 ENCOUNTER — HOSPITAL ENCOUNTER (EMERGENCY)
Age: 69
Discharge: HOME | End: 2024-03-17
Payer: COMMERCIAL

## 2024-03-17 VITALS
OXYGEN SATURATION: 94 % | DIASTOLIC BLOOD PRESSURE: 76 MMHG | TEMPERATURE: 97.7 F | HEIGHT: 67 IN | HEART RATE: 79 BPM | WEIGHT: 237 LBS | BODY MASS INDEX: 37.2 KG/M2 | SYSTOLIC BLOOD PRESSURE: 139 MMHG | RESPIRATION RATE: 18 BRPM

## 2024-03-17 DIAGNOSIS — S82.892A CLOSED FRACTURE OF LEFT ANKLE, INITIAL ENCOUNTER: Primary | ICD-10-CM
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
## 2024-03-17 PROCEDURE — 73564 X-RAY EXAM KNEE 4 OR MORE: CPT

## 2024-03-17 PROCEDURE — 29515 APPLICATION SHORT LEG SPLINT: CPT

## 2024-03-17 PROCEDURE — 73590 X-RAY EXAM OF LOWER LEG: CPT

## 2024-03-17 PROCEDURE — 73610 X-RAY EXAM OF ANKLE: CPT

## 2024-03-17 PROCEDURE — 73630 X-RAY EXAM OF FOOT: CPT

## 2024-03-17 PROCEDURE — 99283 EMERGENCY DEPT VISIT LOW MDM: CPT

## 2024-03-17 PROCEDURE — 6370000000 HC RX 637 (ALT 250 FOR IP): Performed by: PHYSICIAN ASSISTANT

## 2024-03-17 RX ORDER — HYDROCODONE BITARTRATE AND ACETAMINOPHEN 5; 325 MG/1; MG/1
1 TABLET ORAL ONCE
Status: DISCONTINUED | OUTPATIENT
Start: 2024-03-17 | End: 2024-03-17

## 2024-03-17 RX ORDER — TRAMADOL HYDROCHLORIDE 50 MG/1
50 TABLET ORAL ONCE
Status: COMPLETED | OUTPATIENT
Start: 2024-03-17 | End: 2024-03-17

## 2024-03-17 RX ORDER — TRAMADOL HYDROCHLORIDE 50 MG/1
50 TABLET ORAL EVERY 4 HOURS PRN
Qty: 18 TABLET | Refills: 0 | Status: SHIPPED | OUTPATIENT
Start: 2024-03-17 | End: 2024-03-20

## 2024-03-17 RX ADMIN — TRAMADOL HYDROCHLORIDE 50 MG: 50 TABLET ORAL at 16:41

## 2024-03-17 ASSESSMENT — PAIN DESCRIPTION - ORIENTATION: ORIENTATION: LEFT

## 2024-03-17 ASSESSMENT — LIFESTYLE VARIABLES
HOW OFTEN DO YOU HAVE A DRINK CONTAINING ALCOHOL: MONTHLY OR LESS
HOW MANY STANDARD DRINKS CONTAINING ALCOHOL DO YOU HAVE ON A TYPICAL DAY: 1 OR 2

## 2024-03-17 ASSESSMENT — PAIN - FUNCTIONAL ASSESSMENT: PAIN_FUNCTIONAL_ASSESSMENT: 0-10

## 2024-03-17 ASSESSMENT — PAIN SCALES - GENERAL: PAINLEVEL_OUTOF10: 8

## 2024-03-17 ASSESSMENT — PAIN DESCRIPTION - LOCATION: LOCATION: LEG

## 2024-03-17 NOTE — ED PROVIDER NOTES
for outpatient management        I am the Primary Clinician of Record.    FINAL IMPRESSION      1. Closed fracture of left ankle, initial encounter    2. Fall down stairs, initial encounter          DISPOSITION/PLAN     DISPOSITION Discharge - Pending Orders Complete 03/17/2024 05:47:14 PM      PATIENT REFERRED TO:  Jabari eKvin MD  [address] [facility] [address]  [facility] [address] [phone]    In 3 days      Kamaljit Klein MD  [facility] [place] [id number] [phone]            DISCHARGE MEDICATIONS:  New Prescriptions    TRAMADOL (ULTRAM) 50 MG TABLET    Take 1 tablet by mouth every 4 hours as needed for Pain for up to 3 days. Intended supply: 3 days. Take lowest dose possible to manage pain Max Daily Amount: 300 mg       DISCONTINUED MEDICATIONS:  Discontinued Medications    No medications on file              (Please note that portions of this note were completed with a voice recognition program.  Efforts were made to edit the dictations but occasionally words are mis-transcribed.)    Deirdre Gandhi PA-C (electronically signed)            Deirdre Gandhi PA-C  03/17/24 [id number]

## 2024-03-18 ENCOUNTER — TELEPHONE (OUTPATIENT)
Dept: ORTHOPEDIC SURGERY | Age: 69
End: 2024-03-18

## 2024-03-18 NOTE — TELEPHONE ENCOUNTER
I left an VM asking the pt to please call the call center to make a follow from her ED visit for left ankle FX

## 2024-03-21 ENCOUNTER — PREP FOR PROCEDURE (OUTPATIENT)
Dept: ORTHOPEDIC SURGERY | Age: 69
End: 2024-03-21

## 2024-03-21 ENCOUNTER — TELEPHONE (OUTPATIENT)
Dept: ORTHOPEDIC SURGERY | Age: 69
End: 2024-03-21

## 2024-03-21 ENCOUNTER — OFFICE VISIT (OUTPATIENT)
Dept: ORTHOPEDIC SURGERY | Age: 69
End: 2024-03-21
Payer: COMMERCIAL

## 2024-03-21 VITALS — WEIGHT: 237 LBS | HEIGHT: 67 IN | BODY MASS INDEX: 37.2 KG/M2

## 2024-03-21 DIAGNOSIS — S82.832A OTHER CLOSED FRACTURE OF DISTAL END OF LEFT FIBULA, INITIAL ENCOUNTER: Primary | ICD-10-CM

## 2024-03-21 PROCEDURE — 1123F ACP DISCUSS/DSCN MKR DOCD: CPT | Performed by: ORTHOPAEDIC SURGERY

## 2024-03-21 PROCEDURE — 99204 OFFICE O/P NEW MOD 45 MIN: CPT | Performed by: ORTHOPAEDIC SURGERY

## 2024-03-21 NOTE — TELEPHONE ENCOUNTER
Auth: NPR  Date: 03/26/24  Reference # [id number]  Spoke with: None  Type of SX:Outpatient  Location: [facility]  CPT: 65327    DX: S82.832A    SX area: LEFT ANKLE  Insurance: Medicare A&B

## 2024-03-22 RX ORDER — AMLODIPINE BESYLATE 5 MG/1
5 TABLET ORAL DAILY
COMMUNITY

## 2024-03-22 RX ORDER — ALPRAZOLAM 0.25 MG/1
0.25 TABLET ORAL PRN
COMMUNITY

## 2024-03-22 NOTE — H&P (VIEW-ONLY)
and Sexual Activity    Alcohol use: No     Frequency: Never    Drug use: No    Sexual activity: Not on file   Other Topics Concern    Not on file   Social History Narrative    Not on file     Family History:   No family history on file.    Review of Systems:  No personal history of DVT, PE. 12 point ROS otherwise negative other than reported in HPI.    Physical Examination:  Patient is alert and oriented x 3 and appears well nourished and appropriate for today's visit.  Gait: The patient walks with an antalgic gait.  LEFT ankle: SILT, moving digits freely, medial and lateral minimal dorsiflexion, plantarflexion, lateral and medial swelling and tenderness, unable to bear weight    Radiographs: Multiple views of left ankle AP/lateral, oblique, there is a comminuted displaced fracture of the fibular with evidence of medial widening. There is no gross subluxation or dislocation.    Assessment and Plan?: The patient has LEFT bimalleolar equivalent ankle fracture    We discussed the diagnosis and treatment options in detail with the patient. I explained the nature of their unstable ankle fracture fracture and poor outcomes with nonoperative treatment.  We discussed the surgical procedure, recovery period, and protocol for pain management, expected post-operative course in detail.    We discussed the potential benefits of the surgery including improved mobility, pain relief, fracture healing, as well as the inherent risks including but are not limited to infection, nonunion, malunion, hardware failure, nerve or vascular injury, need for further surgery, deep vein thrombus, pulmonary embolism.   The patient has verbalized understanding of these risks and wishes to proceed.

## 2024-03-25 ENCOUNTER — ANESTHESIA EVENT (OUTPATIENT)
Dept: OPERATING ROOM | Age: 69
End: 2024-03-25
Payer: COMMERCIAL

## 2024-03-25 RX ORDER — SODIUM CHLORIDE 0.9 % (FLUSH) 0.9 %
5-40 SYRINGE (ML) INJECTION PRN
Status: CANCELLED | OUTPATIENT
Start: 2024-03-25

## 2024-03-25 RX ORDER — SODIUM CHLORIDE 9 MG/ML
INJECTION, SOLUTION INTRAVENOUS PRN
Status: CANCELLED | OUTPATIENT
Start: 2024-03-25

## 2024-03-25 RX ORDER — CELECOXIB 100 MG/1
100 CAPSULE ORAL ONCE
Status: CANCELLED | OUTPATIENT
Start: 2024-03-25 | End: 2024-03-25

## 2024-03-25 RX ORDER — ACETAMINOPHEN 325 MG/1
1000 TABLET ORAL ONCE
Status: CANCELLED | OUTPATIENT
Start: 2024-03-25 | End: 2024-03-25

## 2024-03-25 RX ORDER — SODIUM CHLORIDE 0.9 % (FLUSH) 0.9 %
5-40 SYRINGE (ML) INJECTION EVERY 12 HOURS SCHEDULED
Status: CANCELLED | OUTPATIENT
Start: 2024-03-25

## 2024-03-25 RX ORDER — ACETAMINOPHEN 500 MG
1000 TABLET ORAL ONCE
Status: CANCELLED | OUTPATIENT
Start: 2024-03-25 | End: 2024-03-25

## 2024-03-25 NOTE — CARE COORDINATION
[facility] left voicemail message to phone regarding homecare process at discharge and confirmation of demographics. Will continue to follow for services if needed post surgery.Electronically signed by Bethel Howard LPN on 3/25/24 at 1:33 PM EDT

## 2024-03-26 ENCOUNTER — APPOINTMENT (OUTPATIENT)
Dept: GENERAL RADIOLOGY | Age: 69
End: 2024-03-26
Attending: ORTHOPAEDIC SURGERY
Payer: COMMERCIAL

## 2024-03-26 ENCOUNTER — ANESTHESIA (OUTPATIENT)
Dept: OPERATING ROOM | Age: 69
End: 2024-03-26
Payer: COMMERCIAL

## 2024-03-26 ENCOUNTER — HOSPITAL ENCOUNTER (OUTPATIENT)
Age: 69
Setting detail: OUTPATIENT SURGERY
Discharge: HOME | End: 2024-03-26
Attending: ORTHOPAEDIC SURGERY | Admitting: ORTHOPAEDIC SURGERY
Payer: COMMERCIAL

## 2024-03-26 VITALS
HEART RATE: 81 BPM | RESPIRATION RATE: 16 BRPM | BODY MASS INDEX: 37.2 KG/M2 | WEIGHT: 237 LBS | HEIGHT: 67 IN | DIASTOLIC BLOOD PRESSURE: 78 MMHG | OXYGEN SATURATION: 97 % | TEMPERATURE: 97.6 F | SYSTOLIC BLOOD PRESSURE: 133 MMHG

## 2024-03-26 DIAGNOSIS — S82.832A OTHER CLOSED FRACTURE OF DISTAL END OF LEFT FIBULA, INITIAL ENCOUNTER: Primary | ICD-10-CM

## 2024-03-26 PROCEDURE — 2500000003 HC RX 250 WO HCPCS: Performed by: NURSE ANESTHETIST, CERTIFIED REGISTERED

## 2024-03-26 PROCEDURE — 27792 TREATMENT OF ANKLE FRACTURE: CPT | Performed by: ORTHOPAEDIC SURGERY

## 2024-03-26 PROCEDURE — 6370000000 HC RX 637 (ALT 250 FOR IP): Performed by: ORTHOPAEDIC SURGERY

## 2024-03-26 PROCEDURE — 6370000000 HC RX 637 (ALT 250 FOR IP): Performed by: STUDENT IN AN ORGANIZED HEALTH CARE EDUCATION/TRAINING PROGRAM

## 2024-03-26 PROCEDURE — 2709999900 HC NON-CHARGEABLE SUPPLY: Performed by: ORTHOPAEDIC SURGERY

## 2024-03-26 PROCEDURE — 6360000002 HC RX W HCPCS: Performed by: STUDENT IN AN ORGANIZED HEALTH CARE EDUCATION/TRAINING PROGRAM

## 2024-03-26 PROCEDURE — A4217 STERILE WATER/SALINE, 500 ML: HCPCS | Performed by: ORTHOPAEDIC SURGERY

## 2024-03-26 PROCEDURE — 7100000010 HC PHASE II RECOVERY - FIRST 15 MIN: Performed by: ORTHOPAEDIC SURGERY

## 2024-03-26 PROCEDURE — 7100000000 HC PACU RECOVERY - FIRST 15 MIN: Performed by: ORTHOPAEDIC SURGERY

## 2024-03-26 PROCEDURE — 6360000002 HC RX W HCPCS: Performed by: NURSE ANESTHETIST, CERTIFIED REGISTERED

## 2024-03-26 PROCEDURE — 3600000014 HC SURGERY LEVEL 4 ADDTL 15MIN: Performed by: ORTHOPAEDIC SURGERY

## 2024-03-26 PROCEDURE — 3600000004 HC SURGERY LEVEL 4 BASE: Performed by: ORTHOPAEDIC SURGERY

## 2024-03-26 PROCEDURE — C1713 ANCHOR/SCREW BN/BN,TIS/BN: HCPCS | Performed by: ORTHOPAEDIC SURGERY

## 2024-03-26 PROCEDURE — 3700000000 HC ANESTHESIA ATTENDED CARE: Performed by: ORTHOPAEDIC SURGERY

## 2024-03-26 PROCEDURE — 2580000003 HC RX 258: Performed by: ORTHOPAEDIC SURGERY

## 2024-03-26 PROCEDURE — 7100000001 HC PACU RECOVERY - ADDTL 15 MIN: Performed by: ORTHOPAEDIC SURGERY

## 2024-03-26 PROCEDURE — 73600 X-RAY EXAM OF ANKLE: CPT

## 2024-03-26 PROCEDURE — 2720000010 HC SURG SUPPLY STERILE: Performed by: ORTHOPAEDIC SURGERY

## 2024-03-26 PROCEDURE — 6360000002 HC RX W HCPCS: Performed by: ORTHOPAEDIC SURGERY

## 2024-03-26 PROCEDURE — 3700000001 HC ADD 15 MINUTES (ANESTHESIA): Performed by: ORTHOPAEDIC SURGERY

## 2024-03-26 PROCEDURE — 7100000011 HC PHASE II RECOVERY - ADDTL 15 MIN: Performed by: ORTHOPAEDIC SURGERY

## 2024-03-26 DEVICE — PLATE BNE L99MM 5 H L DST LAT FIBULAR S STL LOK COMPR FOR: Type: IMPLANTABLE DEVICE | Site: ANKLE | Status: FUNCTIONAL

## 2024-03-26 DEVICE — IMPLANTABLE DEVICE: Type: IMPLANTABLE DEVICE | Site: ANKLE | Status: FUNCTIONAL

## 2024-03-26 DEVICE — SCREW BNE L14MM DIA3.5MM CORT S STL ST NONCANNULATED LOK: Type: IMPLANTABLE DEVICE | Site: ANKLE | Status: FUNCTIONAL

## 2024-03-26 DEVICE — SCREW BNE L14MM DIA2.7MM CORT S STL ST LOK FULL THRD T8: Type: IMPLANTABLE DEVICE | Site: ANKLE | Status: FUNCTIONAL

## 2024-03-26 RX ORDER — SODIUM CHLORIDE 0.9 % (FLUSH) 0.9 %
5-40 SYRINGE (ML) INJECTION PRN
Status: DISCONTINUED | OUTPATIENT
Start: 2024-03-26 | End: 2024-03-26 | Stop reason: HOSPADM

## 2024-03-26 RX ORDER — IPRATROPIUM BROMIDE AND ALBUTEROL SULFATE 2.5; .5 MG/3ML; MG/3ML
1 SOLUTION RESPIRATORY (INHALATION)
Status: DISCONTINUED | OUTPATIENT
Start: 2024-03-26 | End: 2024-03-26 | Stop reason: HOSPADM

## 2024-03-26 RX ORDER — ROCURONIUM BROMIDE 10 MG/ML
INJECTION, SOLUTION INTRAVENOUS PRN
Status: DISCONTINUED | OUTPATIENT
Start: 2024-03-26 | End: 2024-03-26 | Stop reason: SDUPTHER

## 2024-03-26 RX ORDER — HYDRALAZINE HYDROCHLORIDE 20 MG/ML
10 INJECTION INTRAMUSCULAR; INTRAVENOUS
Status: DISCONTINUED | OUTPATIENT
Start: 2024-03-26 | End: 2024-03-26 | Stop reason: HOSPADM

## 2024-03-26 RX ORDER — HYDROMORPHONE HYDROCHLORIDE 2 MG/ML
0.25 INJECTION, SOLUTION INTRAMUSCULAR; INTRAVENOUS; SUBCUTANEOUS EVERY 5 MIN PRN
Status: DISCONTINUED | OUTPATIENT
Start: 2024-03-26 | End: 2024-03-26 | Stop reason: HOSPADM

## 2024-03-26 RX ORDER — SODIUM CHLORIDE 0.9 % (FLUSH) 0.9 %
5-40 SYRINGE (ML) INJECTION EVERY 12 HOURS SCHEDULED
Status: DISCONTINUED | OUTPATIENT
Start: 2024-03-26 | End: 2024-03-26 | Stop reason: HOSPADM

## 2024-03-26 RX ORDER — HYDROMORPHONE HYDROCHLORIDE 2 MG/ML
INJECTION, SOLUTION INTRAMUSCULAR; INTRAVENOUS; SUBCUTANEOUS PRN
Status: DISCONTINUED | OUTPATIENT
Start: 2024-03-26 | End: 2024-03-26 | Stop reason: SDUPTHER

## 2024-03-26 RX ORDER — CEFAZOLIN SODIUM 1 G/3ML
INJECTION, POWDER, FOR SOLUTION INTRAMUSCULAR; INTRAVENOUS PRN
Status: DISCONTINUED | OUTPATIENT
Start: 2024-03-26 | End: 2024-03-26 | Stop reason: SDUPTHER

## 2024-03-26 RX ORDER — SODIUM CHLORIDE 9 MG/ML
INJECTION, SOLUTION INTRAVENOUS PRN
Status: DISCONTINUED | OUTPATIENT
Start: 2024-03-26 | End: 2024-03-26 | Stop reason: HOSPADM

## 2024-03-26 RX ORDER — CEFAZOLIN 2 G/1
INJECTION, POWDER, FOR SOLUTION INTRAMUSCULAR; INTRAVENOUS
Status: COMPLETED
Start: 2024-03-26 | End: 2024-03-26

## 2024-03-26 RX ORDER — MAGNESIUM HYDROXIDE 1200 MG/15ML
LIQUID ORAL CONTINUOUS PRN
Status: COMPLETED | OUTPATIENT
Start: 2024-03-26 | End: 2024-03-26

## 2024-03-26 RX ORDER — PROPOFOL 10 MG/ML
INJECTION, EMULSION INTRAVENOUS PRN
Status: DISCONTINUED | OUTPATIENT
Start: 2024-03-26 | End: 2024-03-26 | Stop reason: SDUPTHER

## 2024-03-26 RX ORDER — NALOXONE HYDROCHLORIDE 0.4 MG/ML
INJECTION, SOLUTION INTRAMUSCULAR; INTRAVENOUS; SUBCUTANEOUS PRN
Status: DISCONTINUED | OUTPATIENT
Start: 2024-03-26 | End: 2024-03-26 | Stop reason: HOSPADM

## 2024-03-26 RX ORDER — LIDOCAINE HYDROCHLORIDE 20 MG/ML
INJECTION, SOLUTION EPIDURAL; INFILTRATION; INTRACAUDAL; PERINEURAL PRN
Status: DISCONTINUED | OUTPATIENT
Start: 2024-03-26 | End: 2024-03-26 | Stop reason: SDUPTHER

## 2024-03-26 RX ORDER — DEXAMETHASONE SODIUM PHOSPHATE 4 MG/ML
8 INJECTION, SOLUTION INTRA-ARTICULAR; INTRALESIONAL; INTRAMUSCULAR; INTRAVENOUS; SOFT TISSUE ONCE
Status: DISCONTINUED | OUTPATIENT
Start: 2024-03-26 | End: 2024-03-26 | Stop reason: HOSPADM

## 2024-03-26 RX ORDER — CELECOXIB 100 MG/1
100 CAPSULE ORAL ONCE
Status: DISCONTINUED | OUTPATIENT
Start: 2024-03-26 | End: 2024-03-26 | Stop reason: HOSPADM

## 2024-03-26 RX ORDER — ACETAMINOPHEN 500 MG
1000 TABLET ORAL ONCE
Status: COMPLETED | OUTPATIENT
Start: 2024-03-26 | End: 2024-03-26

## 2024-03-26 RX ORDER — BUPIVACAINE HYDROCHLORIDE 5 MG/ML
INJECTION, SOLUTION EPIDURAL; INTRACAUDAL
Status: COMPLETED | OUTPATIENT
Start: 2024-03-26 | End: 2024-03-26

## 2024-03-26 RX ORDER — DEXAMETHASONE SODIUM PHOSPHATE 4 MG/ML
INJECTION, SOLUTION INTRA-ARTICULAR; INTRALESIONAL; INTRAMUSCULAR; INTRAVENOUS; SOFT TISSUE PRN
Status: DISCONTINUED | OUTPATIENT
Start: 2024-03-26 | End: 2024-03-26 | Stop reason: SDUPTHER

## 2024-03-26 RX ORDER — ONDANSETRON 2 MG/ML
INJECTION INTRAMUSCULAR; INTRAVENOUS PRN
Status: DISCONTINUED | OUTPATIENT
Start: 2024-03-26 | End: 2024-03-26 | Stop reason: SDUPTHER

## 2024-03-26 RX ORDER — KETAMINE HCL IN NACL, ISO-OSM 100MG/10ML
SYRINGE (ML) INJECTION PRN
Status: DISCONTINUED | OUTPATIENT
Start: 2024-03-26 | End: 2024-03-26 | Stop reason: SDUPTHER

## 2024-03-26 RX ORDER — OXYCODONE HYDROCHLORIDE 5 MG/1
5 TABLET ORAL PRN
Status: COMPLETED | OUTPATIENT
Start: 2024-03-26 | End: 2024-03-26

## 2024-03-26 RX ORDER — DIPHENHYDRAMINE HYDROCHLORIDE 50 MG/ML
12.5 INJECTION INTRAMUSCULAR; INTRAVENOUS
Status: DISCONTINUED | OUTPATIENT
Start: 2024-03-26 | End: 2024-03-26 | Stop reason: HOSPADM

## 2024-03-26 RX ORDER — HALOPERIDOL 5 MG/ML
1 INJECTION INTRAMUSCULAR
Status: DISCONTINUED | OUTPATIENT
Start: 2024-03-26 | End: 2024-03-26 | Stop reason: HOSPADM

## 2024-03-26 RX ORDER — FENTANYL CITRATE 50 UG/ML
INJECTION, SOLUTION INTRAMUSCULAR; INTRAVENOUS PRN
Status: DISCONTINUED | OUTPATIENT
Start: 2024-03-26 | End: 2024-03-26 | Stop reason: SDUPTHER

## 2024-03-26 RX ORDER — LABETALOL HYDROCHLORIDE 5 MG/ML
10 INJECTION, SOLUTION INTRAVENOUS
Status: DISCONTINUED | OUTPATIENT
Start: 2024-03-26 | End: 2024-03-26 | Stop reason: HOSPADM

## 2024-03-26 RX ORDER — OXYCODONE HYDROCHLORIDE AND ACETAMINOPHEN 5; 325 MG/1; MG/1
1 TABLET ORAL EVERY 6 HOURS PRN
Qty: 21 TABLET | Refills: 0 | Status: SHIPPED | OUTPATIENT
Start: 2024-03-26 | End: 2024-04-02

## 2024-03-26 RX ORDER — OXYCODONE HYDROCHLORIDE 5 MG/1
10 TABLET ORAL PRN
Status: COMPLETED | OUTPATIENT
Start: 2024-03-26 | End: 2024-03-26

## 2024-03-26 RX ORDER — ASPIRIN 81 MG/1
81 TABLET ORAL 2 TIMES DAILY
Qty: 30 TABLET | Refills: 0 | Status: SHIPPED | OUTPATIENT
Start: 2024-03-26 | End: 2024-04-09

## 2024-03-26 RX ORDER — HYDROMORPHONE HYDROCHLORIDE 2 MG/ML
0.5 INJECTION, SOLUTION INTRAMUSCULAR; INTRAVENOUS; SUBCUTANEOUS EVERY 5 MIN PRN
Status: COMPLETED | OUTPATIENT
Start: 2024-03-26 | End: 2024-03-26

## 2024-03-26 RX ORDER — MAGNESIUM SULFATE HEPTAHYDRATE 500 MG/ML
INJECTION, SOLUTION INTRAMUSCULAR; INTRAVENOUS PRN
Status: DISCONTINUED | OUTPATIENT
Start: 2024-03-26 | End: 2024-03-26 | Stop reason: SDUPTHER

## 2024-03-26 RX ORDER — ONDANSETRON 2 MG/ML
4 INJECTION INTRAMUSCULAR; INTRAVENOUS
Status: DISCONTINUED | OUTPATIENT
Start: 2024-03-26 | End: 2024-03-26 | Stop reason: HOSPADM

## 2024-03-26 RX ADMIN — ACETAMINOPHEN 1000 MG: 500 TABLET ORAL at 07:15

## 2024-03-26 RX ADMIN — ROCURONIUM BROMIDE 50 MG: 10 INJECTION, SOLUTION INTRAVENOUS at 08:05

## 2024-03-26 RX ADMIN — HYDROMORPHONE HYDROCHLORIDE 0.5 MG: 2 INJECTION, SOLUTION INTRAMUSCULAR; INTRAVENOUS; SUBCUTANEOUS at 09:40

## 2024-03-26 RX ADMIN — HYDROMORPHONE HYDROCHLORIDE 0.5 MG: 2 INJECTION, SOLUTION INTRAMUSCULAR; INTRAVENOUS; SUBCUTANEOUS at 08:58

## 2024-03-26 RX ADMIN — HYDROMORPHONE HYDROCHLORIDE 0.5 MG: 2 INJECTION, SOLUTION INTRAMUSCULAR; INTRAVENOUS; SUBCUTANEOUS at 09:22

## 2024-03-26 RX ADMIN — MAGNESIUM SULFATE HEPTAHYDRATE 1 G: 500 INJECTION, SOLUTION INTRAMUSCULAR; INTRAVENOUS at 08:19

## 2024-03-26 RX ADMIN — Medication 20 MG: at 08:21

## 2024-03-26 RX ADMIN — OXYCODONE 5 MG: 5 TABLET ORAL at 10:58

## 2024-03-26 RX ADMIN — SUGAMMADEX 200 MG: 100 INJECTION, SOLUTION INTRAVENOUS at 08:58

## 2024-03-26 RX ADMIN — Medication 10 MG: at 09:22

## 2024-03-26 RX ADMIN — HYDROMORPHONE HYDROCHLORIDE 0.5 MG: 2 INJECTION, SOLUTION INTRAMUSCULAR; INTRAVENOUS; SUBCUTANEOUS at 09:57

## 2024-03-26 RX ADMIN — DEXAMETHASONE SODIUM PHOSPHATE 12 MG: 4 INJECTION, SOLUTION INTRAMUSCULAR; INTRAVENOUS at 08:24

## 2024-03-26 RX ADMIN — HYDROMORPHONE HYDROCHLORIDE 0.5 MG: 2 INJECTION, SOLUTION INTRAMUSCULAR; INTRAVENOUS; SUBCUTANEOUS at 10:22

## 2024-03-26 RX ADMIN — ONDANSETRON 4 MG: 2 INJECTION INTRAMUSCULAR; INTRAVENOUS at 08:57

## 2024-03-26 RX ADMIN — CEFAZOLIN 2 G: 1 INJECTION, POWDER, FOR SOLUTION INTRAVENOUS at 08:09

## 2024-03-26 RX ADMIN — SODIUM CHLORIDE: 9 INJECTION, SOLUTION INTRAVENOUS at 07:16

## 2024-03-26 RX ADMIN — LIDOCAINE HYDROCHLORIDE 100 MG: 20 INJECTION, SOLUTION EPIDURAL; INFILTRATION; INTRACAUDAL; PERINEURAL at 08:04

## 2024-03-26 RX ADMIN — HYDROMORPHONE HYDROCHLORIDE 0.5 MG: 2 INJECTION, SOLUTION INTRAMUSCULAR; INTRAVENOUS; SUBCUTANEOUS at 08:21

## 2024-03-26 RX ADMIN — FENTANYL CITRATE 100 MCG: 50 INJECTION, SOLUTION INTRAMUSCULAR; INTRAVENOUS at 08:15

## 2024-03-26 RX ADMIN — PROPOFOL 150 MG: 10 INJECTION, EMULSION INTRAVENOUS at 08:04

## 2024-03-26 ASSESSMENT — PAIN SCALES - GENERAL
PAINLEVEL_OUTOF10: 8
PAINLEVEL_OUTOF10: 9
PAINLEVEL_OUTOF10: 3
PAINLEVEL_OUTOF10: 8
PAINLEVEL_OUTOF10: 8

## 2024-03-26 ASSESSMENT — PAIN DESCRIPTION - LOCATION
LOCATION: ANKLE

## 2024-03-26 ASSESSMENT — ENCOUNTER SYMPTOMS: SHORTNESS OF BREATH: 1

## 2024-03-26 ASSESSMENT — PAIN DESCRIPTION - DESCRIPTORS: DESCRIPTORS: ACHING

## 2024-03-26 ASSESSMENT — PAIN - FUNCTIONAL ASSESSMENT
PAIN_FUNCTIONAL_ASSESSMENT: 0-10

## 2024-03-26 ASSESSMENT — PAIN DESCRIPTION - ORIENTATION
ORIENTATION: LEFT

## 2024-03-26 NOTE — DISCHARGE INSTRUCTIONS
INSTRUCTIONS    ACTIVITY: Do not bear weight on your operative leg.. Use a walker to get around.    MEDICATIONS: Upon discharge resume your home medications. Take all medications as prescribed. Take a stool softener (Senna/Colace) if taking narcotic pain medications. Stool softeners are only effective if you are adequately hydrated.  Try and drink 6-8 glasses of water or fluids a day, unless otherwise contraindicated. Despite using Senna/Colace, if you haven't had a bowel movement in 3 days, please switch to Miralax. Miralax is a gentle osmotic laxative and is sold over the counter. Mix one capful of the powder into a glass of water or juice once a day. You should have a bowel movement within 24 hours, if not call the office.     You will be discharged from the hospital with an adequate supply of pain medication. You are encouraged to taper the use of narcotic pain medication as tolerated. Should you require a refill, please call our office. Dr. Klein's office routinely prescribes narcotic pain medication for 4-6 weeks after surgery. If you require pain medication beyond this interval you may be referred to your PCP or to the Pain Clinic for further evaluation.     Plan ahead for refills on pain medication as many narcotics either need to be picked up at the office or mailed. It is best to call 48-72 hours in advance of needing a refill so that you don't run out of medication.     ANTICOAGULATION: Continue your ASPIRIN as prescribed to prevent blood clots (DVT/PE). As long as your incision remains dry and you tolerate anti-inflammatory medications (Meloxicam, Aleve, Advil, Motrin, ibuprofen, naprosyn), it is OK to take these medications as well.    WOUND CARE: . Keep your splint in place, we will remove this at your f/u. Keep the splint dry and clean. You can sponge bathe or shower with the splint covered with plastic wrap/ or bag    Please follow-up with Dr. Klein in orthopedic clinic in about 2 weeks from day of

## 2024-03-26 NOTE — PROGRESS NOTES
Patient is alert, able to converse and have a full conversation. Oxygen saturation on room air remains steady at 95-97. No change in physical assessment and no increase in pain. Ice packs refreshed,.reviewed discharge instructions with patient and . Patient picked up 2 new prescriptions. Both verbalize comfort with being discharged to home, she has medical equipment to include a wheelchair/walker at home and states she has been using them without issue at home since time of injury. Reviewed symptoms of a changed condition that warrant further evaluation. Patient to be discharged to home, appt made for follow up and therapy.   
Patient remains visibly drowsy but is becoming more alert and now able to follow a conversation. Tolerating crackers and fluids. On 1L of oxygen and able to maintain saturations while awake, improvement noted when asleep, drops to 91-92 which increases immediately when stimulated. History of ADARSH, has a cpap at home for use. Denies pain, ice packs x 2 refreshed and applied along sides of dressing.  at bedside, he will  her 2-prescriptions from outpatient pharmacy.   
Patient resting in position of comfort, Left ankle remains elevated with icepacks in place. Denies pain. Continues to appear sleepy but remains easily arousable to verbal stimulation. Oxygen now at 2L from 3 via nasal canula and tolerating well. Rails up and call light in reach. Patient in no apparent distress.   
Phase 1 discharge criteria met. VSS, O2 sats 94% on 3L NC.  LLE dressing CD&I.  LLE neurovascular checks complete and WDL.  Pt reports pain is tolerable. Pt will transfer to phase 2 in stable condition  
Pt admit to pacu from OR, awakening.  VSS, O2 sats 95% on 6L simple mask.  LLE dressing CD&I.  Left foot warm, cap refill <3 sec, pedal pulse palpable.  Will monitor  
Pt notified of new arrival time of 0615.    
Teaching / education initiated regarding perioperative experience, expectations, and pain management during stay. Patient verbalized understanding.    
and insurance card.             19.  Visit our web site for additional information:  [email or web address]              20.During flu season no children under the age of 14 are permitted in the hospital for the safety of all patients.                              21. If you take a long acting insulin in the evening only  take half of your usual  dose the night  before your procedure              22. If you use a c-pap please bring DOS if staying overnight,             23.For your convenience [facility] has a pharmacy on site to fill your prescriptions.             24. If you use oxygen and have a portable tank please bring it  with you the DOS             25. Bring a complete list of all your medications with name and dose include any supplements.             26. Other__________________________________________   *Please call pre admission testing if you any further questions   [place]         [phone]   [place] [phone]   [place]            [phone]    [facility]  [phone]   [facility]   [phone]       VISITOR POLICY(subject to change)    Current policy is 2 visitors per patient. No children. Mask is  at the discretion of the facility. Visiting hours are 8a-8p. Overnight visitors will be at the discretion of the nurse. All policies subject to change.      All above information reviewed with patient in person or by phone.Patient verbalizes understanding.All questions and concerns addressed.                                                                                                 Patient/Rep_patient___________________                                                                                                                                    PRE OP INSTRUCTIONS

## 2024-03-26 NOTE — OP NOTE
Patient: Elayne Austin                    : 1955     MRN: [id number]     Date: 24     SURGEON: Kamaljit Kelin MD     ASSISTANT: SA garcia    PREOPERATIVE DIAGNOSES:     1) Left distal fibula fracture with medial instability: bimalleolar equivalent    POSTOPERATIVE DIAGNOSES: Same     PROCEDURES: Left fibula ORIF    ANESTHESIA: general    COMPLICATIONS: none    ESTIMATED BLOOD LOSS: minimal    SPECIMENS: none    DRAINS: none    TOURNIQUET TIME:  30 minutes    IMPLANTS USED:   Implant Name Type Inv. Item Serial No.  Lot No. LRB No. Used Action   K WIRE FIX L150MM DIA2MM S STL W/ TRCR PNT - [id number]  K WIRE FIX L150MM DIA2MM S STL W/ TRCR PNT  [employer]PU[employer]-  Left 1 Implanted and Explanted   PLATE BNE L99MM 5 H L DST LAT FIBULAR S STL CESARIO COMPR FOR - [id number]  PLATE BNE L99MM 5 H L DST LAT FIBULAR S STL CESARIO COMPR FOR  DEPUY SYNTH[employer]-WD  Left 1 Implanted   SCREW BNE L14MM DIA2.7MM DINA S STL ST CESARIO FULL THRD T8 - [id number]  SCREW BNE L14MM DIA2.7MM DINA S STL ST CESARIO FULL THRD T8  DEPU[employer]-WD  Left 2 Implanted   SCREW BNE L11MM [id number] DINA S STL ST NONCANNULATED CESARIO FULL - [id number]  SCREW BNE L11MM [id number] DINA S STL ST NONCANNULATED CESARIO FULL  DEPU[employer]-WD  Left 3 Implanted   SCREW BNE L14MM DIA3.5MM DINA S STL ST NONCANNULATED CESARIO - [id number]  SCREW BNE L14MM DIA3.5MM DINA S STL ST NONCANNULATED CESARIO  DEPUY SY[employer]THES [place]-WD  Left 3 Implanted        INDICATIONS: Elayne Austin is a 68 y.o. female who sustained a fall with a twisting injury and a distal fibula fracture this was comminuted with significant discomfort with weightbearing she was found to have some medial widening concerning for ankle instability.  Therefore decided decided to proceed with surgical fixation of her fibula with possible syndesmotic fixation..    The risks and benefits of surgery were discussed with the patient, as well as the alternatives to surgery and the expected post-operative

## 2024-03-26 NOTE — ANESTHESIA POSTPROCEDURE EVALUATION
Department of Anesthesiology  Postprocedure Note    Patient: Elayne Austin  MRN: [id number]  YOB: 1955  Date of evaluation: 3/26/2024    Procedure Summary       Date: 03/26/24 Room / Location: [address]    Anesthesia Start: 0752 Anesthesia Stop:     Procedure: LEFT ANKLE OPEN REDUCTION INTERNAL FIXATION-SYNTHES (Left: Ankle) Diagnosis:       Closed fracture of left distal fibula      (Closed fracture of left distal fibula [S82.832A])    Surgeons: Kamaljit Klein MD Responsible Provider: Manuel Page MD    Anesthesia Type: general ASA Status: 3            Anesthesia Type: No value filed.    Nando Phase I: Nando Score: 8    Nando Phase II:      Anesthesia Post Evaluation    Patient location during evaluation: PACU  Patient participation: complete - patient participated  Level of consciousness: awake and alert  Pain score: 3  Airway patency: patent  Nausea & Vomiting: no nausea  Cardiovascular status: blood pressure returned to baseline  Respiratory status: acceptable  Hydration status: euvolemic  Pain management: adequate    No notable events documented.

## 2024-03-26 NOTE — ANESTHESIA PRE PROCEDURE
Department of Anesthesiology  Preprocedure Note       Name:  Elayne Austin   Age:  68 y.o.  :  1955                                          MRN:  [id number]         Date:  3/26/2024      Surgeon: Surgeon(s):  Kamaljit Klein MD    Procedure: Procedure(s):  LEFT ANKLE OPEN REDUCTION INTERNAL FIXATION-SYNTHES    Medications prior to admission:   Prior to Admission medications    Medication Sig Start Date End Date Taking? Authorizing Provider   amLODIPine (NORVASC) 5 MG tablet Take 1 tablet by mouth daily   Yes Angel Guzman MD   ALPRAZolam (XANAX) 0.25 MG tablet Take 1 tablet by mouth as needed for Sleep.   Yes Angel Guzman MD   omeprazole (PRILOSEC) 40 MG capsule Take 1 capsule by mouth daily as needed    Angel Guzman MD   hyoscyamine (LEVSIN/SL) 0.125 MG SL tablet Place 1 tablet under the tongue every 6 hours as needed for Cramping    Angel Guzman MD   montelukast (SINGULAIR) 10 MG tablet Take 1 tablet by mouth nightly    Angel Guzman MD   triazolam (HALCION) 0.25 MG tablet Take 1 tablet by mouth nightly.    Angel Guzman MD   Multiple Vitamins-Minerals (THERAPEUTIC MULTIVITAMIN-MINERALS) tablet Take 1 tablet by mouth daily  Patient not taking: Reported on 3/22/2024    Angel Guzman MD   calcium carbonate (OSCAL) 500 MG TABS tablet Take 1 tablet by mouth daily    Angel Guzman MD   magnesium 30 MG tablet Take 1 tablet by mouth daily 250    Angel Guzman MD   UNABLE TO FIND IODINE ONE DROP DAILY AND KELP ONE DROP DAILY  Patient not taking: Reported on 3/22/2024    Angel Guzman MD   fluticasone-salmeterol (ADVAIR) 500-50 MCG/DOSE diskus inhaler Inhale 1 puff into the lungs in the morning and 1 puff in the evening.    Angel Guzman MD   albuterol (PROVENTIL HFA;VENTOLIN HFA) 108 (90 BASE) MCG/ACT inhaler Inhale 2 puffs into the lungs every 6 hours as needed for Wheezing    Angel Guzman MD   cyclobenzaprine

## 2024-04-12 ENCOUNTER — OFFICE VISIT (OUTPATIENT)
Dept: ORTHOPEDIC SURGERY | Age: 69
End: 2024-04-12

## 2024-04-12 VITALS — WEIGHT: 237 LBS | BODY MASS INDEX: 38.09 KG/M2 | HEIGHT: 66 IN

## 2024-04-12 DIAGNOSIS — S82.832A OTHER CLOSED FRACTURE OF DISTAL END OF LEFT FIBULA, INITIAL ENCOUNTER: Primary | ICD-10-CM

## 2024-04-12 NOTE — PROGRESS NOTES
Patient: Elayne Austin                  : 1955   MRN: [id number]   Date of Visit: 24     Physician: Kamaljit Klein MD.     Reason for Visit: Status post LEFT      Subjective History of Present Illness:     Elayne Austin is here for regularly scheduled follow-up s/p left distal fibula ORIF.  She is doing well she is having intermittent pain has been nonweightbearing    Physical Examination??: ?   General: Patient is alert and oriented x 3 and appears comfortable.   Left ankle incision clean dry intact no evidence of erythema streaking.  Sensation intact to light touch moving digits freely    Radiographs: 3 x-rays of the left ankle including AP lateral and oblique there is well-positioned bridge plate of the distal fibula there is no widening of the mortise.    Assessment and Plan?: The patient is progressing well approximately 2 weeks s/p left distal fibula ORIF    1. A thorough discussion was had with the patient concerning the ?postoperative course and the patient is in agreement with the plan.   2.  At this point I am comfortable proceeding with weightbearing as tolerated in a boot we fitted her for a boot today we discussed pain medications  3.  I provided her an exercise sheet    4.  I will see her back in 2 weeks with repeat x-rays at that time.    _______________________      Kamaljit Klein MD

## 2024-04-26 ENCOUNTER — OFFICE VISIT (OUTPATIENT)
Dept: ORTHOPEDIC SURGERY | Age: 69
End: 2024-04-26

## 2024-04-26 VITALS — BODY MASS INDEX: 37.2 KG/M2 | HEIGHT: 67 IN | WEIGHT: 237 LBS

## 2024-04-26 DIAGNOSIS — S82.832A OTHER CLOSED FRACTURE OF DISTAL END OF LEFT FIBULA, INITIAL ENCOUNTER: Primary | ICD-10-CM

## 2024-04-26 PROCEDURE — 99024 POSTOP FOLLOW-UP VISIT: CPT | Performed by: ORTHOPAEDIC SURGERY

## 2024-04-26 RX ORDER — OXYCODONE HYDROCHLORIDE AND ACETAMINOPHEN 5; 325 MG/1; MG/1
1 TABLET ORAL EVERY 8 HOURS PRN
Qty: 21 TABLET | Refills: 0 | Status: SHIPPED | OUTPATIENT
Start: 2024-04-26 | End: 2024-05-03

## 2024-04-26 NOTE — PROGRESS NOTES
Patient: Elayne Austin                  : 1955   MRN: [id number]   Date of Visit: 24     Physician: Kamaljit Klein MD.     Reason for Visit: Status post LEFT      Subjective History of Present Illness:     Elayne Austin is here for regularly scheduled follow-up s/p left distal fibula ORIF.  She is doing well she is having intermittent pain has been nonweightbearing    Physical Examination??: ?   General: Patient is alert and oriented x 3 and appears comfortable.   Left ankle incision clean dry intact no evidence of erythema streaking.  Sensation intact to light touch moving digits freely    Radiographs: 3 x-rays of the left ankle including AP lateral and oblique there is well-positioned bridge plate of the distal fibula there is no widening of the mortise.    Assessment and Plan?: The patient is progressing well approximately 4 weeks s/p left distal fibula ORIF    1. A thorough discussion was had with the patient concerning the ?postoperative course and the patient is in agreement with the plan.   2.  At this point I am comfortable proceeding with weightbearing as tolerated in a boot we fitted her for a boot today we discussed pain medications  3.  In 2 weeks we will determine whether she can proceed with PT    4.  I will see her back in 2 weeks with repeat x-rays at that time.    _______________________      Kamaljit Klein MD

## 2024-05-10 ENCOUNTER — OFFICE VISIT (OUTPATIENT)
Dept: ORTHOPEDIC SURGERY | Age: 69
End: 2024-05-10

## 2024-05-10 DIAGNOSIS — S82.832A OTHER CLOSED FRACTURE OF DISTAL END OF LEFT FIBULA, INITIAL ENCOUNTER: Primary | ICD-10-CM

## 2024-05-10 PROCEDURE — 99024 POSTOP FOLLOW-UP VISIT: CPT | Performed by: ORTHOPAEDIC SURGERY

## 2024-05-10 NOTE — PROGRESS NOTES
Patient: Elayne Austin                  : 1955   MRN: [id number]   Date of Visit: 5/10/24     Physician: Kamaljit Klein MD.     Reason for Visit: Status post LEFT      Subjective History of Present Illness:     Elayne Austin is here for regularly scheduled follow-up s/p left distal fibula ORIF.  She is doing well overall. She has been compliant with her boot. She is ambulating with a walker today.     Physical Examination??: ?   General: Patient is alert and oriented x 3 and appears comfortable.   Left ankle incision clean dry intact no evidence of erythema streaking. Well healed.  Sensation intact to light touch moving digits freely. 5 degrees active dorsiflexion, 20 active plantarflexion. 5 degrees inv/ev.    Radiographs: 3 x-rays of the left ankle including AP lateral and oblique there is well-positioned bridge plate of the distal fibula there is no widening of the mortise. Interval healing present.     Assessment and Plan?: The patient is progressing well approximately 6 weeks s/p left distal fibula ORIF    1. A thorough discussion was had with the patient concerning the ?postoperative course and the patient is in agreement with the plan.   2.  At this point she may begin weaning the boot in 2 weeks. She is WBAT. We will get her started in physical therapy to work on ROM, strength, and function.   3. I will see her back in 4 weeks with repeat x-rays at that time.    _______________________      Will RIK Yeung

## 2024-06-07 ENCOUNTER — OFFICE VISIT (OUTPATIENT)
Dept: ORTHOPEDIC SURGERY | Age: 69
End: 2024-06-07

## 2024-06-07 VITALS — HEIGHT: 67 IN | BODY MASS INDEX: 37.2 KG/M2 | WEIGHT: 237 LBS

## 2024-06-07 DIAGNOSIS — S82.832A OTHER CLOSED FRACTURE OF DISTAL END OF LEFT FIBULA, INITIAL ENCOUNTER: Primary | ICD-10-CM

## 2024-06-07 PROCEDURE — 99024 POSTOP FOLLOW-UP VISIT: CPT | Performed by: PHYSICIAN ASSISTANT

## 2024-06-07 NOTE — PROGRESS NOTES
Patient: Elayne Austin                  : 1955   MRN: [id number]   Date of Visit: 24     Physician: Kamaljit Klein MD.     Reason for Visit: Status post LEFT distal fibula ORIF on 3/26/2024    Subjective History of Present Illness:     Elayne Austin is here for regularly scheduled follow-up s/p left distal fibula ORIF.  She is doing well overall. She is in physical therapy. She has discontinued her boot.     Physical Examination??: ?   General: Patient is alert and oriented x 3 and appears comfortable.   Left ankle incision clean dry intact, well healed.   ROM is WNL  Able to wiggle all toes  Distal SILT, CR< sec    Radiographs: 3 view radiographs of the left ankle were ordered and reviewed in clinic today demonstrating distal fibula plate.  Distal fibula fracture with good callus formation, and interval healing.  No evidence of hardware complication or failure.    Assessment and Plan?: The patient is progressing well approximately 10 weeks s/p left distal fibula ORIF    1. A thorough discussion was had with the patient concerning the ?postoperative course and the patient is in agreement with the plan.   2.  She may continue physical therapy working on ROM/Strength/Function.   3. OTC NSAIDs, tylenol as needed.   4. Slowly progress activity as tolerated.   5.  I will see her back in 6 months with repeat x-rays at that time.    _______________________      Will RIK Yeung

## (undated) DEVICE — C-ARMOR C-ARM EQUIPMENT COVERS CLEAR STERILE UNIVERSAL FIT 12 PER CASE: Brand: C-ARMOR

## (undated) DEVICE — DRAPE,U/ SHT,SPLIT,PLAS,STERIL: Brand: MEDLINE

## (undated) DEVICE — MARKER,SKIN,WI/RULER AND LABELS: Brand: MEDLINE

## (undated) DEVICE — BIT DRL L140MM DIA2MM QUIK CPL 3 FLUT CALIB DEPTH MRK W/O

## (undated) DEVICE — SYRINGE, LUER LOCK, 10ML: Brand: MEDLINE

## (undated) DEVICE — BIT DRL L110MM DIA2.5MM G QUIK CPL W/O STP REUSE

## (undated) DEVICE — SUTURE VCRL + SZ 3-0 L36IN ABSRB UD L36MM CT-1 1/2 CIR VCP944H

## (undated) DEVICE — ELECTRODE PT RET AD L9FT HI MOIST COND ADH HYDRGEL CORDED

## (undated) DEVICE — TOWEL,OR,DSP,ST,BLUE,STD,4/PK,20PK/CS: Brand: MEDLINE

## (undated) DEVICE — GLOVE SURG SZ 8.5 L11.85IN FNGR THK9.8MIL STRW LTX POLYMER

## (undated) DEVICE — STAPLER SKIN H3.9MM WIRE DIA0.58MM CRWN 6.9MM 35 STPL ROT

## (undated) DEVICE — BANDAGE COMPR W6INXL10YD ST M E WHITE/BEIGE

## (undated) DEVICE — SOLUTION IRRIG 500ML 0.9% SOD CHLO USP POUR PLAS BTL

## (undated) DEVICE — GAUZE,SPONGE,4"X4",8PLY,STRL,LF,10/TRAY: Brand: MEDLINE

## (undated) DEVICE — PADDING CAST N ADH 12X6 IN CRIMPED FINISH 100% COTTON WBRLII

## (undated) DEVICE — SYRINGE MED 10ML TRNSLUC BRL PLUNG BLK MRK POLYPR CTRL

## (undated) DEVICE — STERILE LATEX POWDER FREE SURGICAL GLOVES WITH HYDROGEL COATING: Brand: PROTEXIS

## (undated) DEVICE — MERCY FAIRFIELD TURNOVER KIT: Brand: MEDLINE INDUSTRIES, INC.

## (undated) DEVICE — SYRINGE 60ML BULB IRRIG ST LF

## (undated) DEVICE — SUTURE VCRL + SZ 3-0 L18IN ABSRB UD SH 1/2 CIR TAPERCUT NDL VCP864D

## (undated) DEVICE — CAST BL 15X4IN SPECIALIST PLASTER

## (undated) DEVICE — SUTURE VCRL + SZ 2-0 L36IN ABSRB UD L36MM CT-1 1/2 CIR VCP945H

## (undated) DEVICE — C-ARM: Brand: UNBRANDED

## (undated) DEVICE — MAJOR SET UP PK

## (undated) DEVICE — SUTURE VCRL + SZ 2-0 L18IN ABSRB UD CT1 L36MM 1/2 CIR VCP839D

## (undated) DEVICE — SUTURE MCRYL + SZ 4-0 L27IN ABSRB UD L19MM PS-2 3/8 CIR MCP426H

## (undated) DEVICE — BANDAGE,ELASTIC,ESMARK,STERILE,6"X9',LF: Brand: MEDLINE

## (undated) DEVICE — STANDARD HYPODERMIC NEEDLE,POLYPROPYLENE HUB: Brand: MONOJECT

## (undated) DEVICE — 3M™ IOBAN™ 2 ANTIMICROBIAL INCISE DRAPE 6650EZ: Brand: IOBAN™ 2

## (undated) DEVICE — T-DRAPE,EXTREMITY,STERILE: Brand: MEDLINE

## (undated) DEVICE — BANDAGE COMPR W6INXL12FT SMOOTH FOR LIMB EXSANG ESMARCH

## (undated) DEVICE — GOWN,AURORA,NONREINF,RAGLAN,XXL,STERILE: Brand: MEDLINE

## (undated) DEVICE — PADDING CAST W4INXL4YD ST COT RAYON MICROPLEATED HIGHLY

## (undated) DEVICE — SHEET,DRAPE,53X77,STERILE: Brand: MEDLINE

## (undated) DEVICE — K WIRE FIX L150MM DIA2MM S STL W/ TRCR PNT
Type: IMPLANTABLE DEVICE | Site: ANKLE | Status: NON-FUNCTIONAL
Removed: 2024-03-26

## (undated) DEVICE — INTENDED FOR TISSUE SEPARATION, AND OTHER PROCEDURES THAT REQUIRE A SHARP SURGICAL BLADE TO PUNCTURE OR CUT.: Brand: BARD-PARKER ® STAINLESS STEEL BLADES

## (undated) DEVICE — APPLICATOR MEDICATED 26 CC SOLUTION HI LT ORNG CHLORAPREP